# Patient Record
Sex: MALE | Race: OTHER | HISPANIC OR LATINO
[De-identification: names, ages, dates, MRNs, and addresses within clinical notes are randomized per-mention and may not be internally consistent; named-entity substitution may affect disease eponyms.]

---

## 2021-04-07 ENCOUNTER — APPOINTMENT (OUTPATIENT)
Dept: DISASTER EMERGENCY | Facility: OTHER | Age: 46
End: 2021-04-07
Payer: COMMERCIAL

## 2021-04-07 PROCEDURE — 0001A: CPT

## 2021-04-28 ENCOUNTER — APPOINTMENT (OUTPATIENT)
Dept: DISASTER EMERGENCY | Facility: OTHER | Age: 46
End: 2021-04-28
Payer: MEDICAID

## 2021-04-28 PROCEDURE — 0002A: CPT

## 2022-04-13 ENCOUNTER — EMERGENCY (EMERGENCY)
Facility: HOSPITAL | Age: 47
LOS: 1 days | Discharge: HOME | End: 2022-04-13
Attending: EMERGENCY MEDICINE | Admitting: EMERGENCY MEDICINE
Payer: MEDICAID

## 2022-04-13 VITALS
SYSTOLIC BLOOD PRESSURE: 136 MMHG | DIASTOLIC BLOOD PRESSURE: 84 MMHG | RESPIRATION RATE: 18 BRPM | HEART RATE: 76 BPM | TEMPERATURE: 99 F | OXYGEN SATURATION: 100 %

## 2022-04-13 LAB
ALBUMIN SERPL ELPH-MCNC: 5 G/DL — SIGNIFICANT CHANGE UP (ref 3.3–5)
ALP SERPL-CCNC: 82 U/L — SIGNIFICANT CHANGE UP (ref 40–120)
ALT FLD-CCNC: 32 U/L — SIGNIFICANT CHANGE UP (ref 4–41)
ANION GAP SERPL CALC-SCNC: 14 MMOL/L — SIGNIFICANT CHANGE UP (ref 7–14)
AST SERPL-CCNC: 28 U/L — SIGNIFICANT CHANGE UP (ref 4–40)
BASOPHILS # BLD AUTO: 0.04 K/UL — SIGNIFICANT CHANGE UP (ref 0–0.2)
BASOPHILS NFR BLD AUTO: 0.2 % — SIGNIFICANT CHANGE UP (ref 0–2)
BILIRUB SERPL-MCNC: 0.8 MG/DL — SIGNIFICANT CHANGE UP (ref 0.2–1.2)
BUN SERPL-MCNC: 12 MG/DL — SIGNIFICANT CHANGE UP (ref 7–23)
CALCIUM SERPL-MCNC: 9.7 MG/DL — SIGNIFICANT CHANGE UP (ref 8.4–10.5)
CHLORIDE SERPL-SCNC: 100 MMOL/L — SIGNIFICANT CHANGE UP (ref 98–107)
CO2 SERPL-SCNC: 26 MMOL/L — SIGNIFICANT CHANGE UP (ref 22–31)
CREAT SERPL-MCNC: 0.83 MG/DL — SIGNIFICANT CHANGE UP (ref 0.5–1.3)
EGFR: 109 ML/MIN/1.73M2 — SIGNIFICANT CHANGE UP
EOSINOPHIL # BLD AUTO: 0.01 K/UL — SIGNIFICANT CHANGE UP (ref 0–0.5)
EOSINOPHIL NFR BLD AUTO: 0.1 % — SIGNIFICANT CHANGE UP (ref 0–6)
GLUCOSE SERPL-MCNC: 107 MG/DL — HIGH (ref 70–99)
HCT VFR BLD CALC: 47 % — SIGNIFICANT CHANGE UP (ref 39–50)
HGB BLD-MCNC: 15.8 G/DL — SIGNIFICANT CHANGE UP (ref 13–17)
IANC: 14.81 K/UL — HIGH (ref 1.8–7.4)
IMM GRANULOCYTES NFR BLD AUTO: 0.6 % — SIGNIFICANT CHANGE UP (ref 0–1.5)
LIDOCAIN IGE QN: 30 U/L — SIGNIFICANT CHANGE UP (ref 7–60)
LYMPHOCYTES # BLD AUTO: 0.82 K/UL — LOW (ref 1–3.3)
LYMPHOCYTES # BLD AUTO: 5.1 % — LOW (ref 13–44)
MCHC RBC-ENTMCNC: 28.9 PG — SIGNIFICANT CHANGE UP (ref 27–34)
MCHC RBC-ENTMCNC: 33.6 GM/DL — SIGNIFICANT CHANGE UP (ref 32–36)
MCV RBC AUTO: 86.1 FL — SIGNIFICANT CHANGE UP (ref 80–100)
MONOCYTES # BLD AUTO: 0.36 K/UL — SIGNIFICANT CHANGE UP (ref 0–0.9)
MONOCYTES NFR BLD AUTO: 2.2 % — SIGNIFICANT CHANGE UP (ref 2–14)
NEUTROPHILS # BLD AUTO: 14.81 K/UL — HIGH (ref 1.8–7.4)
NEUTROPHILS NFR BLD AUTO: 91.8 % — HIGH (ref 43–77)
NRBC # BLD: 0 /100 WBCS — SIGNIFICANT CHANGE UP
NRBC # FLD: 0 K/UL — SIGNIFICANT CHANGE UP
PLATELET # BLD AUTO: 248 K/UL — SIGNIFICANT CHANGE UP (ref 150–400)
POTASSIUM SERPL-MCNC: 4.2 MMOL/L — SIGNIFICANT CHANGE UP (ref 3.5–5.3)
POTASSIUM SERPL-SCNC: 4.2 MMOL/L — SIGNIFICANT CHANGE UP (ref 3.5–5.3)
PROT SERPL-MCNC: 8.2 G/DL — SIGNIFICANT CHANGE UP (ref 6–8.3)
RBC # BLD: 5.46 M/UL — SIGNIFICANT CHANGE UP (ref 4.2–5.8)
RBC # FLD: 12.7 % — SIGNIFICANT CHANGE UP (ref 10.3–14.5)
SODIUM SERPL-SCNC: 140 MMOL/L — SIGNIFICANT CHANGE UP (ref 135–145)
WBC # BLD: 16.13 K/UL — HIGH (ref 3.8–10.5)
WBC # FLD AUTO: 16.13 K/UL — HIGH (ref 3.8–10.5)

## 2022-04-13 PROCEDURE — 76705 ECHO EXAM OF ABDOMEN: CPT | Mod: 26

## 2022-04-13 PROCEDURE — 99285 EMERGENCY DEPT VISIT HI MDM: CPT

## 2022-04-13 RX ORDER — METOCLOPRAMIDE HCL 10 MG
1 TABLET ORAL
Qty: 28 | Refills: 0
Start: 2022-04-13 | End: 2022-04-19

## 2022-04-13 RX ORDER — FAMOTIDINE 10 MG/ML
20 INJECTION INTRAVENOUS ONCE
Refills: 0 | Status: COMPLETED | OUTPATIENT
Start: 2022-04-13 | End: 2022-04-13

## 2022-04-13 RX ORDER — FAMOTIDINE 10 MG/ML
2 INJECTION INTRAVENOUS
Qty: 28 | Refills: 0
Start: 2022-04-13 | End: 2022-04-19

## 2022-04-13 RX ADMIN — FAMOTIDINE 20 MILLIGRAM(S): 10 INJECTION INTRAVENOUS at 21:20

## 2022-04-13 NOTE — ED PROVIDER NOTE - NSFOLLOWUPINSTRUCTIONS_ED_ALL_ED_FT
Biliary Colic, Adult    Biliary colic is severe pain caused by a problem with a small organ in the upper right part of your belly (gallbladder). The gallbladder stores a digestive fluid produced in the liver (bile) that helps the body break down fat. Bile and other digestive enzymes are carried from the liver to the small intestine through tube-like structures (bile ducts). The gallbladder and the bile ducts form the biliary tract.    Sometimes hard deposits of digestive fluids form in the gallbladder (gallstones) and block the flow of bile from the gallbladder, causing biliary colic. This condition is also called a gallbladder attack. Gallstones can be as small as a grain of sand or as big as a golf ball. There could be just one gallstone in the gallbladder, or there could be many.     What are the causes?  Biliary colic is usually caused by gallstones. Less often, a tumor could block the flow of bile from the gallbladder and trigger biliary colic.    What increases the risk?  This condition is more likely to develop in:    Women.  People of  descent.  People with a family history of gallstones.  People who are obese.  People who suddenly or quickly lose weight.  People who eat a high-calorie, low-fiber diet that is rich in refined carbs (carbohydrates), such as white bread and white rice.  People who have an intestinal disease that affects nutrient absorption, such as Crohn disease.  People who have a metabolic condition, such as metabolic syndrome or diabetes.    What are the signs or symptoms?  Severe pain in the upper right side of the belly is the main symptom of biliary colic. You may feel this pain below the chest but above the hip. This pain often occurs at night or after eating a very fatty meal. This pain may get worse for up to an hour and last as long as 12 hours. In most cases, the pain fades (subsides) within a couple hours.    Other symptoms of this condition include:    Nausea and vomiting.  Pain under the right shoulder.    How is this diagnosed?  This condition is diagnosed based on your medical history, your symptoms, and a physical exam. You may have tests, including:    Blood tests to rule out infection or inflammation of the bile ducts, gallbladder, pancreas, or liver.  Imaging studies such as:  Ultrasound.  CT scan.  MRI.    In some cases, you may need to have an imaging study done using a small amount of radioactive material (nuclear medicine) to confirm the diagnosis.    How is this treated?  Treatment for this condition may include medicine to relieve your pain or nausea. If you have gallstones that are causing biliary colic, you may need surgery to remove the gallbladder (cholecystectomy). Gallstones can also be dissolved gradually with medicine. It may take months or years before the gallstones are completely gone.    Follow these instructions at home:  Take over-the-counter and prescription medicines only as told by your health care provider.  Drink enough fluid to keep your urine clear or pale yellow.  Follow instructions from your health care provider about eating or drinking restrictions. These may include avoiding:  Fatty, greasy, and fried foods.  Any foods that make the pain worse.  Overeating.  Having a large meal after not eating for a while.  Keep all follow-up visits as told by your health care provider. This is important.    How is this prevented?  Steps to prevent this condition include:    Maintaining a healthy body weight.  Getting regular exercise.  Eating a healthy, high-fiber, low-fat diet.  Limiting how much sugar and refined carbs you eat, such as sweets, white flour, and white rice.    Contact a health care provider if:  Your pain lasts more than 5 hours.  You vomit.  You have a fever and chills.  Your pain gets worse.    Get help right away if:  Your skin or the whites of your eyes look yellow (jaundice).  Your have tea-colored urine and light-colored stools.  You are dizzy or you faint.    Summary  Biliary colic is severe pain caused by a problem with a small organ in the upper right part of your belly (gallbladder).  Treatments for this condition include medicines that relieves your pain or nausea and medicines that slowly dissolves the gallstones.  If gallstones cause your biliary colic, the treatment is surgery to remove the gallbladder (cholecystectomy).    ADDITIONAL NOTES AND INSTRUCTIONS    Please follow up with your Primary MD in 24-48 hr.  Seek immediate medical care for any new/worsening signs or symptoms.

## 2022-04-13 NOTE — ED PROVIDER NOTE - OBJECTIVE STATEMENT
The patient is a 46y Male who denies any past medical and surgery history PTED from UC to r/o gallstones after poor response to Zofran after sudden onset N copious V and abdominal pain after meal @4:30pm; also had diarrhea =soft stools No Urinary s/s. no chest pain. In [facility] before event ? chills no fever

## 2022-04-13 NOTE — ED PROVIDER NOTE - NSPTACCESSSVCSAPPTDETAILS_ED_ALL_ED_FT
Pt. needs to start checking his blood sugars. Prescription for testing supplies sent to pharmacy per pt.  Request. urgent referral for management of biliary colic

## 2022-04-13 NOTE — ED PROVIDER NOTE - CLINICAL SUMMARY MEDICAL DECISION MAKING FREE TEXT BOX
The patient is a 46y Male who denies any past medical and surgery history PTED from UC to r/o gallstones after poor response to Zofran after sudden onset N copious V and abdominal pain after meal @4:30pm; also had diarrhea =soft stools No Urinary s/s. no chest pain. In [facility] before event   PAST MEDICAL & SURGICAL HISTORY:  No pertinent past medical history    No significant past surgical history     PTED with   Vital Signs Last 24 Hrs  T(F): 99.1 HR: 76 BP: 136/84 RR: 18 (13 Apr 2022 20:26) (18 - 18)  PE: as described; my additions and exceptions are noted in the chart    DATA:    LAB: Pending at time of evaluation     IMPRESSION/RISK:  Dx=biliary colic    Consideration include: obviously distressing episode given elevation of WBCs (demargination) and colic given sudden onset and disappearance of s/s doubt urinary stone   Plan  ivfs  analgesics  RUQsono  reassess  dispo as per results and response; even with probability of demargination findings c/w acute cholecystitis warrants consult; if negative for same can d/c with surgery followup   Plan

## 2022-04-13 NOTE — ED ADULT TRIAGE NOTE - CHIEF COMPLAINT QUOTE
Pt is c/o abdominal pain and vomiting since 4:30pm, vomited several times and also has diarrhea. Denies fever. PMH of HTN. Pt went to urgent care and was given Zofran but he vomited that also. Sent to ER to R/ O gall bladder problem

## 2022-04-13 NOTE — ED PROVIDER NOTE - PATIENT PORTAL LINK FT
You can access the FollowMyHealth Patient Portal offered by [facility] by registering at the following website: [email or web address]. By joining [employer]’s FollowMyHealth portal, you will also be able to view your health information using other applications (apps) compatible with our system.

## 2022-04-13 NOTE — ED PROVIDER NOTE - PROGRESS NOTE DETAILS
Pt signed out to me from Dr. Heart pending RUQ US eval for acute cholecystitis. US showing sludge but otherwise normal. Labs unremarkable with exception of elevated WBC, likely 2/2 vomiting earlier. Pt is pain free, no nausea, tolerating PO, comfortable with d/c home, will f/u with PCP. Return to ER precautions discussed.

## 2023-08-30 ENCOUNTER — EMERGENCY (EMERGENCY)
Facility: HOSPITAL | Age: 48
LOS: 1 days | Discharge: HOME | End: 2023-08-30
Attending: EMERGENCY MEDICINE
Payer: MEDICAID

## 2023-08-30 VITALS
HEIGHT: 66 IN | OXYGEN SATURATION: 99 % | SYSTOLIC BLOOD PRESSURE: 163 MMHG | DIASTOLIC BLOOD PRESSURE: 115 MMHG | WEIGHT: 145.06 LBS | TEMPERATURE: 99 F | RESPIRATION RATE: 19 BRPM | HEART RATE: 92 BPM

## 2023-08-30 VITALS
TEMPERATURE: 98 F | RESPIRATION RATE: 18 BRPM | HEART RATE: 80 BPM | SYSTOLIC BLOOD PRESSURE: 146 MMHG | OXYGEN SATURATION: 98 % | DIASTOLIC BLOOD PRESSURE: 87 MMHG

## 2023-08-30 PROCEDURE — G1004: CPT

## 2023-08-30 PROCEDURE — 99284 EMERGENCY DEPT VISIT MOD MDM: CPT | Mod: 25

## 2023-08-30 PROCEDURE — 12031 INTMD RPR S/A/T/EXT 2.5 CM/<: CPT

## 2023-08-30 PROCEDURE — 70450 CT HEAD/BRAIN W/O DYE: CPT | Mod: 26,MG

## 2023-08-30 PROCEDURE — 90471 IMMUNIZATION ADMIN: CPT

## 2023-08-30 PROCEDURE — 70450 CT HEAD/BRAIN W/O DYE: CPT | Mod: MG

## 2023-08-30 PROCEDURE — 90715 TDAP VACCINE 7 YRS/> IM: CPT

## 2023-08-30 PROCEDURE — 12001 RPR S/N/AX/GEN/TRNK 2.5CM/<: CPT

## 2023-08-30 RX ORDER — TETANUS TOXOID, REDUCED DIPHTHERIA TOXOID AND ACELLULAR PERTUSSIS VACCINE, ADSORBED 5; 2.5; 8; 8; 2.5 [IU]/.5ML; [IU]/.5ML; UG/.5ML; UG/.5ML; UG/.5ML
0.5 SUSPENSION INTRAMUSCULAR ONCE
Refills: 0 | Status: COMPLETED | OUTPATIENT
Start: 2023-08-30 | End: 2023-08-30

## 2023-08-30 RX ORDER — ACETAMINOPHEN 500 MG
650 TABLET ORAL ONCE
Refills: 0 | Status: COMPLETED | OUTPATIENT
Start: 2023-08-30 | End: 2023-08-30

## 2023-08-30 RX ADMIN — TETANUS TOXOID, REDUCED DIPHTHERIA TOXOID AND ACELLULAR PERTUSSIS VACCINE, ADSORBED 0.5 MILLILITER(S): 5; 2.5; 8; 8; 2.5 SUSPENSION INTRAMUSCULAR at 17:53

## 2023-08-30 RX ADMIN — Medication 650 MILLIGRAM(S): at 18:43

## 2023-08-30 NOTE — ED ADULT NURSE NOTE - OBJECTIVE STATEMENT
46 y/o male arrives to the ER complaining of fall.  Pt reports being fishing and slipped, falling face down, sustaining a L side eyebrow laceration, multiple face abrasions, L side forearm abrasions and swelling, L hand abrasion. Pt reports pain of face, R upper back and L forearm. Pt denies LOC, head trauma, use of anticoagulants.  Pt denies SOB, chest pain, dizziness prior the event. On assessment pt is well appearing, A&Ox4, speaking coherently, airway is patent, breathing spontaneously and unlabored. Skin is dry, warm. Full ROM in all extremities. 48 y/o male arrives to the ER complaining of fall.  Pt reports being fishing and slipped, falling forward, hitting his face, sustaining a L side eyebrow laceration, multiple face abrasions, L side forearm abrasions and swelling, L hand abrasion. Pt reports pain of face, R upper back and L forearm. Pt denies LOC,  use of anticoagulants.  Pt denies SOB, chest pain, dizziness prior the event. On assessment pt is well appearing, A&Ox4, speaking coherently, airway is patent, breathing spontaneously and unlabored. Skin is dry, warm. Full ROM in all extremities.

## 2023-08-30 NOTE — ED PROVIDER NOTE - PATIENT PORTAL LINK FT
You can access the FollowMyHealth Patient Portal offered by [facility] by registering at the following website: [email or web address]. By joining [email or web address]’s FollowMyHealth portal, you will also be able to view your health information using other applications (apps) compatible with our system.

## 2023-08-30 NOTE — ED PROVIDER NOTE - PROGRESS NOTE DETAILS
CT scan is negative for acute injury, patient does have some swelling above the right eye but no signs of entrapment with full extraocular eye movements that are painless, intact vision, discussed return precautions and patient cleared for discharge

## 2023-08-30 NOTE — ED PROVIDER NOTE - NSFOLLOWUPINSTRUCTIONS_ED_ALL_ED_FT
You were seen in the emergency department today for a laceration to your head after falling.  It required suture repair.  He also had a CAT scan of your head which showed no fracture of the skull and no bleeding into the brain.  The sutures that were placed need to be removed in 5 to 7 days, please follow-up with your primary care doctor to discuss that.  If you start to have severe headache, difficulty walking difficulty talking, drainage or redness from the suture sites or other concerns please return to the emergency department You were seen in the emergency department today for a laceration to your head after falling.  It required suture repair.  He also had a CAT scan of your head which showed no fracture of the skull and no bleeding into the brain.  The sutures that were placed need to be removed in 5 to 7 days, please follow-up with your primary care doctor to discuss that.  If you start to have severe headache, difficulty walking difficulty talking, drainage or redness from the suture sites or other concerns please return to the emergency department    If you start to has difficulty moving her eye or pain with moving your eye or inability to open your eye please return to the emergency department    Continue to keep the area clean and dry, wash with soap and water frequently

## 2023-08-30 NOTE — ED PROCEDURE NOTE - DEPTH OF REPAIR FOR WOUND CLOSURE
subcutaneous tissue tremors of left hand, reports he has had this for 10 years less depressed, not anxious somewhat concrete mild paranoia limited to low end of fair

## 2023-08-30 NOTE — ED PROVIDER NOTE - PHYSICAL EXAMINATION
CONSTITUTIONAL: Well appearing, awake, alert, oriented to person, place, time/situation and in no apparent distress.  ENMT: Airway patent, Nasal mucosa clear. Mouth with normal mucosa. EOMI without pain  EYES: Clear bilaterally, pupils equal, round and reactive to light.  CARDIAC: Normal rate, regular rhythm.  Heart sounds S1, S2.  No murmurs, rubs or gallops.  RESPIRATORY: Breath sounds clear and equal bilaterally.   GASTROINTESTINAL: soft, nontender, no rebound, no guarding   MUSCULOSKELETAL: No bruising normal ROM, no deformity  NEUROLOGICAL: Alert and oriented, no focal deficits, no motor or sensory deficits.   SKIN: 3 cm deep laceration to the right eyebrow and frontal scalp, multiple superficial abrasions to the left arm without bleeding

## 2023-08-30 NOTE — ED ADULT NURSE NOTE - NSFALLRISKINTERV_ED_ALL_ED

## 2023-08-30 NOTE — ED PROVIDER NOTE - CLINICAL SUMMARY MEDICAL DECISION MAKING FREE TEXT BOX
47-year-old male presented to the emergency department after slipping on the rocks and hitting his head.  Sustained a very deep laceration to the frontal scalp standing into the eyebrow, not involving the eye.  No swelling around the eye intact extraocular eye movements without restriction.  Will update tetanus, will provide laceration repair with sutures, will get CT head to evaluate for intracranial injury or skull injury.  We will clean off superficial abrasions in the left arm but no need for deeper laceration repair given superficial nature 47-year-old male presented to the emergency department after slipping on the rocks and hitting his head.  Sustained a very deep laceration to the frontal scalp standing into the eyebrow, not involving the eye.  No swelling around the eye intact extraocular eye movements without restriction.  Will update tetanus, will provide laceration repair with sutures, will get CT head to evaluate for intracranial injury or skull injury.  We will clean off superficial abrasions in the left arm but no need for deeper laceration repair given superficial nature  Abner: 47 year old male with no pmhx here after slipping on rocks while fishing. slipped, striking forehead. no loc. deep laceration to right ebrow. and multiple superfiical abrasions to left arm. tetanus unknown. will close laceration, ct imaging, tetanus, reassess.

## 2023-08-30 NOTE — ED PROVIDER NOTE - OBJECTIVE STATEMENT
47-year-old male otherwise healthy presents after slipping on the rocks while fishing.  [place] [address], he was walking back with his fishing pole and he slipped, he fell forward hitting his head on the rock.  He sustained a deep laceration to the right eyebrow which was covered.  He also sustained multiple superficial lacerations to the left arm.  Not known his last tetanus was.  He did not lose consciousness he has been having a bit of a headache but denies any neck pain.

## 2025-08-05 ENCOUNTER — APPOINTMENT (OUTPATIENT)
Dept: DERMATOLOGY | Facility: CLINIC | Age: 50
End: 2025-08-05
Payer: COMMERCIAL

## 2025-08-05 ENCOUNTER — NON-APPOINTMENT (OUTPATIENT)
Age: 50
End: 2025-08-05

## 2025-08-05 VITALS — HEIGHT: 66 IN | BODY MASS INDEX: 23.24 KG/M2

## 2025-08-05 VITALS — WEIGHT: 144 LBS | HEIGHT: 66 IN | BODY MASS INDEX: 23.14 KG/M2

## 2025-08-05 DIAGNOSIS — L25.9 UNSPECIFIED CONTACT DERMATITIS, UNSPECIFIED CAUSE: ICD-10-CM

## 2025-08-05 PROBLEM — Z00.00 ENCOUNTER FOR PREVENTIVE HEALTH EXAMINATION: Status: ACTIVE | Noted: 2025-08-05

## 2025-08-05 PROCEDURE — 99204 OFFICE O/P NEW MOD 45 MIN: CPT

## 2025-08-05 RX ORDER — TRIAMCINOLONE ACETONIDE 1 MG/G
0.1 OINTMENT TOPICAL
Qty: 1 | Refills: 3 | Status: ACTIVE | COMMUNITY
Start: 2025-08-05 | End: 1900-01-01